# Patient Record
(demographics unavailable — no encounter records)

---

## 2025-05-22 NOTE — HISTORY OF PRESENT ILLNESS
[Postpartum Follow Up] : postpartum follow up [Delivery Date: ___] : on [unfilled] [] : delivered by vaginal delivery [Female] : Delivery History: baby girl [FreeTextEntry8] : 1-week post-partum follow up

## 2025-06-26 NOTE — HISTORY OF PRESENT ILLNESS
[Postpartum Follow Up] : postpartum follow up [Delivery Date: ___] : on [unfilled] [Female] : Delivery History: baby girl [Wt. ___] : weighing [unfilled] [Back Pain] : back pain [] : delivered by vaginal delivery [Breastfeeding] : not currently nursing [Abdominal Pain] : no abdominal pain [Chest Pain] : no chest pain [Cracked Nipples] : no cracked nipples [S/Sx PP Depression] : no signs/symptoms of postpartum depression [Episiotomy Site Pain] : no episiotomy site pain [Heavy Bleeding] : no heavy bleeding [Incisional Drainage] : no incisional drainage [Chills] : no chills [Fatigue] : fatigue [Dysuria] : no dysuria [Fever] : no fever [Headache] : no headache [Nausea] : no nausea [Vomiting] : no vomiting [None] : no vaginal bleeding [Normal] : the vagina was normal [Cervix Sample Taken] : cervical sample taken for a Pap smear [Doing Well] : is doing well [No Sign of Infection] : is showing no signs of infection [FreeTextEntry8] : 6weeks post partum and pap smear [FreeTextEntry1] : Pap smear obtained. Patient cleared for intercourse and exercise. Contraception counseling performed - risks and benefits of different methods reviewed. Patient elects to use oral contraceptive pills which has worked best for her in the past. She is no longer breast feeding and feels that her mental health is significantly improved. Patient has a strong support system at home and has been following with her therapist. Challenges of the  period addressed and signs and symptoms of postpartum depression reviewed. Return precautions discussed. Patient verbalized understanding and that she has no additional questions.